# Patient Record
Sex: MALE | Race: WHITE | URBAN - METROPOLITAN AREA
[De-identification: names, ages, dates, MRNs, and addresses within clinical notes are randomized per-mention and may not be internally consistent; named-entity substitution may affect disease eponyms.]

---

## 2017-09-25 ENCOUNTER — HISTORICAL (OUTPATIENT)
Dept: ADMINISTRATIVE | Facility: HOSPITAL | Age: 77
End: 2017-09-25

## 2019-12-18 ENCOUNTER — HISTORICAL (OUTPATIENT)
Dept: ADMINISTRATIVE | Facility: HOSPITAL | Age: 79
End: 2019-12-18

## 2020-01-13 ENCOUNTER — HISTORICAL (OUTPATIENT)
Dept: CARDIOLOGY | Facility: HOSPITAL | Age: 80
End: 2020-01-13

## 2021-10-27 ENCOUNTER — HISTORICAL (OUTPATIENT)
Dept: ADMINISTRATIVE | Facility: HOSPITAL | Age: 81
End: 2021-10-27

## 2022-04-09 ENCOUNTER — HISTORICAL (OUTPATIENT)
Dept: ADMINISTRATIVE | Facility: HOSPITAL | Age: 82
End: 2022-04-09

## 2022-04-25 VITALS
HEIGHT: 63 IN | DIASTOLIC BLOOD PRESSURE: 59 MMHG | SYSTOLIC BLOOD PRESSURE: 109 MMHG | WEIGHT: 244 LBS | BODY MASS INDEX: 43.23 KG/M2

## 2022-04-30 NOTE — OP NOTE
DATE OF SURGERY:    01/13/2020    SURGEON:  Zoltan Villa MD    INDICATION FOR PROCEDURES:  Chest pain, positive stress test.    PROCEDURES PERFORMED:    1. Moderate sedation.  2. Left radial artery access under ultrasound guidance - under ultrasound guidance, the left radial artery was well-visualized.  No evidence of thrombus.  Left radial artery was cannulated.  A 6-Thai 11 cm arterial sheath was placed.  3. Selective left and right coronary angiogram.  4. Selective angiogram of vein graft to the left anterior descending.  5. Selective angiogram of vein graft to the obtuse marginal.  6. Status post laser atherectomy of the mid body of the vein graft to the left anterior descending.  We used a 0.9 laser and did 2 runs of laser atherectomy.  7. Status post balloon angioplasty and stenting of the vein graft to the left anterior descending using a 2.5 x 12 drug-eluting stent.  8. Status post dilatation using a 2.75 x 12 drug-eluting stent.  9. Intravascular ultrasound of the vein graft to the left anterior descending.    PROCEDURE IN DETAIL:  The patient was brought to the cardiac catheterization laboratory in a fasting nonsedated state, prepped and draped in the usual sterile fashion.  2% lidocaine was used in the right groin area as local anesthesia.  Using micropuncture needle, left radial artery was accessed.  A 6-Thai 11 cm arterial sheath was placed.  Then using a JL4 and JR4 catheter, left and right coronary __________ Seldinger technique.  Multiple angiographic views were obtained under fluoroscopy.  The patient had 2 vein grafts.  I engaged both the vein grafts using an AL1 catheter and multiple angiographic views were obtained under fluoroscopy.  There was a stent in the body of the vein graft to the LAD.  It had an in-stent restenosis of 80%.  We proceeded with balloon angioplasty and stenting of the lesion.  We used an AL1 guide, engaged the vein graft to the LAD, crossed it with a KAY  blue wire.  Then we did laser atherectomy using a 0.9 laser, 2-3 runs were performed.  There was one spot by the previous stent that appeared to be under expanded.  Even after balloon angioplasty, he still had severe residual stenosis, so we proceeded with stenting.  We used a 2.5 x 12 mm drug-eluting stent and post dilated using a 2.5 noncompliant balloon.  Then we did an intravascular ultrasound post stenting which showed good stent position.  All catheters and wires were then removed.  The procedure deemed complete.    RESULTS:    1. The left main bifurcates into the LAD and left circumflex artery.  The distal left main has a 70% to 80% stenosis.  2. The left circumflex artery is patent with around 40% mid stenosis right before the anastomosis of the vein graft.  3. There is a diagonal which is patent.  4. The LAD is totally occluded.  5. The right coronary artery has mild atherosclerotic disease with no significant stenosis, gives rise to a large PDA and posterolateral artery which have mild to moderate atherosclerotic disease.  6. The vein graft to the LAD is patent at its origin.  In the body there is a stent that has severe in-stent restenosis in the body of the vein graft to the LAD.  We proceeded with balloon angioplasty and stenting.  We did laser atherectomy, balloon angioplasty, and stenting of the body of the vein graft to the LAD.  Used a 2.5 x 12 mm drug-eluting stent and post dilated using a 2.75 balloon.  On intravascular ultrasound post intervention, it appeared the stent appeared to be well-opposed.    PLAN AND ASSESSMENT:  Status post balloon angioplasty and stenting of the vein graft to the left anterior descending using a 2.5 x 12 mm drug-eluting stent, post dilated using a 2.75 balloon.  The patient will be on aspirin and Plavix.     The patient will be discharged later home today.        ______________________________  MD HAYDEE Snow/UK  DD:  01/13/2020  Time:  12:57PM  DT:   01/13/2020  Time:  01:30PM  Job #:  [id number]

## 2022-05-02 NOTE — HISTORICAL [facility] CERNER
This is a historical note converted from [employer]. Formatting and pictures may have been removed.  Please reference Cererendira for original formatting and attached multimedia. Chief Complaint  Here for bilateral hip pain. Pt stated the pain has been going on for about 1 year.  History of Present Illness  Patient is here for evaluation of bilateral buttock pain and back pain with radiation into the posterior thighs.? He has had a history of discectomy?in the past.? He has no groin pain.? He has no lateral hip pain.? No bowel or bladder dysfunction.? No numbness.? No tingling.  Review of Systems  Systemic: No fever, no chills, and no recent weight change.  Head: No headache - frequent.  Eyes: No vision problems.  Otolarnygeal: No hearing loss, no earache, no epistaxis, no hoarseness, and no tooth pain. Gums normal.  Cardiovascular: No chest pain or discomfort and no palpitations.  Pulmonary: No pulmonary symptoms - difficulty sleeping, no dyspnea, and cough not worse in the morning.  Gastrointestinal: Appetite not decreased. No dysphagia and no constant eructation. No nausea, no vomiting, no abdominal pain, no hematochezia, and no loose/mushy stools - frequent. No constipation - frequent.  Genitourinary: No genitourinary symptoms - Getting up every night to urinate and no increase in urinary frequency. No urinary hesitancy. No urinary loss of control - difficulty stopping urination and no burning sensation during urination.  Musculoskeletal: No calf muscle cramps and no localized soft tissue swelling of the ankle.  Neurological: No fainting and no convulsions.  Psychological: Not feeling nervous tension, not feeling nervous from exhaustion, and no depression.  Skin: No rash. Previous history of no ulcers.  Physical Exam  Vitals & Measurements  T:?37.0? ?C (Oral)? HR:?60(Peripheral)? BP:?109/59?  HT:?161.00?cm? WT:?110.670?kg? BMI:?42.7?  Lumbar exam:  Normal hip range of motion with no tenderness  No anterior hip  tenderness  No lateral hip tenderness  No tenderness over the greater trochanteric bursae  Tenderness in the right and left iliolumbar regions  No midline lumbar tenderness  Tenderness with forward flexion as well as right and left?lateral bend  Tenderness with?lumbar extension  Tenderness in the?right and left sciatic notches?and buttocks  2+ dorsal pedal pulses  Normal sensation  Normal motor function  Patient ambulates slightly stooped over with a cane  Radiographs of the?pelvis and both hips?show normal cartilage spaces and no?significant hip pathology. ?Patient has?lumbar degenerative disc disease.  Assessment/Plan  1.?Lumbar degenerative disc disease?M51.36  ?PT ordered?for lumbar program  Ordered:  Clinic Follow-up PRN, 10/27/21 11:57:00 CDT, Future Order, [facility]  Office/Outpatient Visit Level 3 Established [id number] PC, Lumbar degenerative disc disease  Lumbar radiculopathy, [facility], 10/27/21 11:56:00 CDT  ?  2.?Lumbar radiculopathy?M54.16  Ordered:  Clinic Follow-up PRN, 10/27/21 11:57:00 CDT, Future Order, [facility]  Office/Outpatient Visit Level 3 Established [id number] PC, Lumbar degenerative disc disease  Lumbar radiculopathy, [facility], 10/27/21 11:56:00 CDT  ?  Orders:  XR Hips Bilateral W AP Pelvis, Routine, 10/27/21 11:01:00 CDT, None, Ambulatory, Rad Type, Bilateral hip pain, Not Scheduled, 10/27/21 11:01:00 CDT  Referrals  PT/OT Ambulatory Referral, Specialty: Physical Therapy, Start: 10/27/21 11:51:00 CDT, 4, Home Exercise program  No Dry Needling  Therapeutic Excercise, Instructions: ****LUMBAR PT ORDERS********, Degenerative disc disease, lumbar  Sciatica, 3 X Week  Clinic Follow-up PRN, 10/27/21 11:57:00 CDT, Future Order, [facility]   Problem List/Past Medical History  Ongoing  Contusion of right knee  DM (diabetes mellitus)  History of arthroplasty of right knee  Lumbar degenerative disc disease  Lumbar radiculopathy  Morbid obesity  Primary  osteoarthritis of left knee  Tobacco user  Historical  CAD (coronary atherosclerotic disease)  HTN (hypertension)  Hyperlipidemia  LA (obstructive sleep apnea)  Procedure/Surgical History  Catheter placement in coronary artery(s) for coronary angiography, including intraprocedural injection(s) for coronary angiography, imaging supervision and interpretation; with catheter placement(s) in bypass graft(s) (internal mammary, free arterial, katie (01/13/2020)  Dilation of Coronary Artery, One Artery with Drug-eluting Intraluminal Device, Percutaneous Approach (01/13/2020)  Endoluminal imaging of coronary vessel or graft using intravascular ultrasound (IVUS) or optical coherence tomography (OCT) during diagnostic evaluation and/or therapeutic intervention including imaging supervision, interpretation and report; initial vess (01/13/2020)  Extirpation of Matter from Coronary Artery, One Artery, Percutaneous Approach (01/13/2020)  Fluoroscopy of Multiple Coronary Artery Bypass Grafts using Low Osmolar Contrast (01/13/2020)  Perc d-e cor revasc t CABG s (01/13/2020)  Ultrasonography of Multiple Coronary Arteries, Intravascular (01/13/2020)  Ultrasonography of Single Coronary Artery, Intravascular (01/13/2020)  Dilation of Coronary Artery, One Site with Drug-eluting Intraluminal Device, Percutaneous Approach (02/02/2016)  Dilation of Coronary Artery, One Site, Percutaneous Approach (02/02/2016)  Fluoroscopy of Left Heart using Low Osmolar Contrast (02/02/2016)  Fluoroscopy of Multiple Coronary Arteries using Low Osmolar Contrast (02/02/2016)  Fluoroscopy of Multiple Coronary Artery Bypass Grafts using Low Osmolar Contrast (02/02/2016)  Measurement of Cardiac Sampling and Pressure, Left Heart, Percutaneous Approach (02/02/2016)  Cardiac pacemaker (2014)  CABG x 2 - Coronary artery bypass grafts x 2 (1992)  BACK SURGERY (1976)  CARDIAC STENTS  SHOULDER REPLACEMENTS  Total knee replacement   Medications  amLODIPine 10 mg oral  tablet, 10 mg= 1 tab(s), Oral, Daily  aspirin 81 mg oral tablet, 81 mg= 1 tab(s), Oral, Daily  atorvastatin 80 mg oral tablet, 80 mg= 1 tab(s), Oral, Daily  benazepril 40 mg oral tablet, 40 mg= 1 tab(s), Oral, Daily  clopidogrel 75 mg oral tablet, 75 mg= 1 tab(s), Oral, Daily  diclofenac 1% topical gel, 2 gm, TOP, QID  fluoxetine 10 mg oral capsule, 10 mg= 1 cap(s), Oral, Daily  furosemide 20 mg oral tablet, 20 mg= 1 tab(s), Oral, Daily  isosorbide MONOnitrate 60 mg oral tablet, Extended Release, 60 mg= 1 tab(s), Oral, Daily  levothyroxine 100 mcg (0.1 mg) oral tablet, 100 mcg= 1 tab(s), Oral, Daily  metFORMIN 500 mg oral tablet, extended release, 500 mg= 1 tab(s), Oral, BID  metoprolol succinate 25 mg oral tablet, extended release, 25 mg= 1 tab(s), Oral, Daily  Pantoprazole 40 mg ORAL EC-Tablet, 40 mg= 1 tab(s), Oral, Daily  Allergies  No Known Medication Allergies  Social History  Abuse/Neglect  No, 10/27/2021  Alcohol - Denies Alcohol Use, 02/02/2016  Never, 09/25/2017  Employment/School  Retired, 09/25/2017  Home/Environment  Lives with Spouse., 09/25/2017  Substance Use - Denies Substance Abuse, 02/02/2016  Tobacco - Denies Tobacco Use, 02/02/2016  Former smoker, quit more than 30 days ago, No, 10/27/2021  Never (less than 100 in lifetime), N/A, 12/18/2019  Family History  Coronary artery disease: Father.  Health Maintenance  Health Maintenance  ???Pending?(in the next year)  ??? ??OverDue  ??? ? ? ?Depression Screening due??09/25/18??and every 1??year(s)  ??? ? ? ?Smoking Cessation due??01/01/21??and every 1??year(s)  ??? ? ? ?Advance Directive due??01/02/21??and every 1??year(s)  ??? ? ? ?Cognitive Screening due??01/02/21??and every 1??year(s)  ??? ? ? ?Functional Assessment due??01/02/21??and every 1??year(s)  ??? ??Due?  ??? ? ? ?ADL Screening due??10/27/21??and every 1??year(s)  ??? ? ? ?Diabetes Maintenance-Eye Exam due??10/27/21??Unknown Frequency  ??? ? ? ?Diabetes Maintenance-Foot Exam  due??10/27/21??Unknown Frequency  ??? ? ? ?Medicare Annual Wellness Exam due??10/27/21??and every 1??year(s)  ??? ? ? ?Pneumococcal Vaccine due??10/27/21??Unknown Frequency  ??? ? ? ?Tetanus Vaccine due??10/27/21??and every 10??year(s)  ??? ? ? ?Zoster Vaccine due??10/27/21??Unknown Frequency  ??? ??Due In Future?  ??? ? ? ?Obesity Screening not due until??01/01/22??and every 1??year(s)  ??? ? ? ?Fall Risk Assessment not due until??01/02/22??and every 1??year(s)  ??? ? ? ?Diabetes Maintenance-HgbA1c not due until??02/18/22??and every 1??year(s)  ??? ? ? ?Hypertension Management-BMP not due until??03/23/22??and every 1??year(s)  ???Satisfied?(in the past 1 year)  ??? ??Satisfied?  ??? ? ? ?Blood Pressure Screening on??10/27/21.??Satisfied by Yajaira Reyez LPN.  ??? ? ? ?Body Mass Index Check on??10/27/21.??Satisfied by Yajaira Reyez LPN.  ??? ? ? ?Fall Risk Assessment on??10/27/21.??Satisfied by Yajaira Reyez LPN.  ??? ? ? ?Hypertension Management-Blood Pressure on??10/27/21.??Satisfied by Yajaira Reyez LPN.  ??? ? ? ?Obesity Screening on??10/27/21.??Satisfied by Yajaira Reyez LPN.  ?

## 2022-05-02 NOTE — HISTORICAL [facility] CERNER
This is a historical note converted from [employer]. Formatting and pictures may have been removed.  Please reference Cererendira for original formatting and attached multimedia. Chief Complaint  LEFT KNEE PAIN. NO INJURY.  History of Present Illness  ?left_ KNEE  ?  Knee joint pain, Worse with weightbearing  Slowly worsens with extended activity  Pain is increased by bending it and by twisting  complains of knee joint swelling and stiffness  Catching and grating during movement  Review of Systems  Systemic: No fever, no chills, recent?26 lb?weight gain  Head: No headache - frequent.  Eyes: No vision problems.  Otolarnygeal: No hearing loss, no earache, no epistaxis, no hoarseness, and no tooth pain. Gums normal.  Cardiovascular: No chest pain or discomfort and no palpitations.  Pulmonary: No pulmonary symptoms - difficulty sleeping, no dyspnea, and cough not worse in the morning.  Gastrointestinal: Appetite not decreased. No dysphagia and no constant eructation. No nausea, no vomiting, no abdominal pain, no hematochezia, and no loose/mushy stools - frequent. No constipation - frequent.  Genitourinary: No genitourinary symptoms - Getting up every night to urinate and no increase in urinary frequency. No urinary hesitancy. No urinary loss of control - difficulty stopping urination and no burning sensation during urination.  Musculoskeletal: No calf muscle cramps and no localized soft tissue swelling of the ankle.  Neurological: No fainting and no convulsions.  Psychological: Not feeling nervous tension, not feeling nervous from exhaustion, and no depression.  Skin: No rash. Previous history of no ulcers.  Physical Exam  Vitals & Measurements  BP:?132/70?  HT:?157?cm? HT:?157?cm? WT:?102.5?kg? WT:?102.5?kg? BMI:?41.58?  PHYSICAL FINDINGS  Cardiovascular:  Arterial Pulses: Posterior tibialis pulses were normal left. Dorsalis pedis pulses were normal left.  Musculoskeletal System:  Thigh:  Left Thigh: Thigh showed quadriceps  atrophy.  Knee:  Left Knee: Examined.  Knee:  Grade in the knee: Value  Grade effusion 1  Genu varum. Patella demonstrated crepitus. Anteromedial aspect was tender on palpation. Medial aspect was tender on palpation. Medial collateral ligament was tender on palpation. Active motion.  Left Knee:  Left Knee Motion: Value  Active flexion 120_ degrees  Active extension 0_ degrees  Pain was elicited by flexion. No erythema. No warmth. No medial instability. No lateral instability. No one plane medial (straight) instability. No one plane lateral (straight) instability. A Lachman test did not demonstrate one plane anterior instability.  Neurological:  Gait And Stance: A left-sided antalgic gait was observed.  ?  ?  TESTS  Imaging:  X-Ray Knee:  A complete knee x-ray with standing views was performed -of left knee.  AP and lateral view x-rays of the left knee with sunrise view of the patella were performed -of left knee.  ?  ?  IMPRESSIONS RADIOLOGY TEST  Mild?narrowing of the medial compartment?of the left knee.? Patient has bone on bone in the patellofemoral?articulation of the left knee.? Chondrocalcinosis of the lateral compartment.  Assessment/Plan  1.?Primary osteoarthritis of left knee  ? Home exercises  Ordered:  betamethasone, 12 mg, Intra-Articular, Once, first dose 09/25/17 9:00:00 CDT, stop date 09/25/17 9:00:00 CDT, 24  Lidocaine inj., 2 mL, Intra-Articular, Once, first dose 09/25/17 8:36:00 CDT, stop date 09/25/17 8:36:00 CDT  asp/inj jnt/bursa, major [id number] PC, 09/25/17 8:36:00 CDT, [facility], Routine, 09/25/17 8:36:00 CDT  Office/Outpatient Visit Level 3 Established [id number] PC, Primary osteoarthritis of left knee  Morbid obesity, [facility], 09/25/17 8:36:00 CDT  ?  2.?Morbid obesity  ? Weight loss  Ordered:  Office/Outpatient Visit Level 3 Established [id number] PC, Primary osteoarthritis of left knee  Morbid obesity, [facility], 09/25/17 8:36:00 CDT  ?  Orders:  Clinic  Follow-up PRN, 09/25/17 8:36:00 CDT, Future Order, [facility]   Problem List/Past Medical History  Ongoing  DM (diabetes mellitus)  Morbid obesity  Primary osteoarthritis of left knee  Tobacco user  Historical  CAD (coronary atherosclerotic disease)  HTN (hypertension)  Hyperlipidemia  LA (obstructive sleep apnea)  Procedure/Surgical History  Dilation of Coronary Artery, One Site with Drug-eluting Intraluminal Device, Percutaneous Approach (02/02/2016)  Dilation of Coronary Artery, One Site, Percutaneous Approach (02/02/2016)  Fluoroscopy of Left Heart using Low Osmolar Contrast (02/02/2016)  Fluoroscopy of Multiple Coronary Arteries using Low Osmolar Contrast (02/02/2016)  Fluoroscopy of Multiple Coronary Artery Bypass Grafts using Low Osmolar Contrast (02/02/2016)  Measurement of Cardiac Sampling and Pressure, Left Heart, Percutaneous Approach (02/02/2016)  Cardiac pacemaker 27-APR-2016 23:35:49<$> (2014)  CABG x 2 - Coronary artery bypass grafts x 2 (1992)  BACK SURGERY (1976)  CARDIAC STENTS  SHOULDER REPLACEMENTS  Total knee replacement 27-APR-2016 12:19:03<$>  Medications  aspirin 81 mg oral tablet, 81 mg= 1 tab(s), Oral, Daily  benazepril 20 mg oral tablet, 20 mg= 1 tab(s), Oral, Daily  Celestone, 12 mg, Intra-Articular, Once  cetirizine 10 mg oral capsule, 10 mg= 1 cap(s), Oral, Daily, PRN  clonazePAM 1 mg oral tablet, 1 mg= 1 tab(s), Oral, At Bedtime  Effient 10 mg oral tablet, 10 mg= 1 tab(s), Oral, Daily, 3 refills  fluoxetine 20 mg oral capsule, 20 mg= 1 cap(s), Oral, Daily  hydrochlorothiazide-triamterene 25 mg-37.5 mg oral capsule, 1 cap(s), Oral, Daily  isosorbide MONOnitrate 30 mg oral tablet, Extended Release, 30 mg= 1 tab(s), Oral, qAM  isosorbide MONOnitrate 30 mg oral tablet, Extended Release, 1/2 TABS, Oral, qAM,? ?Not taking  levothyroxine 112 mcg (0.112 mg) oral tablet, 112 mcg= 1 tab(s), Oral, Daily  lidocaine 2% injectable solution, 2 mL, Intra-Articular, Once  metformin 500 mg oral  tablet, 500 mg= 1 tab(s), Oral, Daily  Pantoprazole 40 mg ORAL EC-Tablet, 40 mg= 1 tab(s), Oral, Daily  simvastatin 20 mg oral tablet, 20 mg= 1 tab(s), Oral, Once a day (at bedtime)  Allergies  No Known Medication Allergies  Social History  Alcohol - Denies Alcohol Use, 09/25/2017  Never  Employment/School - 09/25/2017  Retired  Home/Environment - 09/25/2017  Lives with Spouse.  Substance Abuse - Denies Substance Abuse, 09/25/2017  Tobacco - Denies Tobacco Use, 09/25/2017  Smoker, current status unknown  Family History  Coronary artery disease: Father.      After verbal consent left knee was prepped in sterile fashion. 2 mL of lidocaine and 2 mL of betamethasone was injected intra-articularly on a 25-gauge needle. Patient tolerated the procedure well  ?  ?  ?  ?

## 2022-05-02 NOTE — HISTORICAL [facility] CERNER
This is a historical note converted from Jony. Formatting and pictures may have been removed.  Please reference Jony for original formatting and attached multimedia. Chief Complaint  Pt is here for right clavical pain and right knee pain. Pt stated the pain started when he fell 2 weeks ago.  History of Present Illness  Jonn comes in today for evaluation of his right knee and right?clavicle pain.? He fell 1 week ago. ?He has a history of right total knee arthroplasty.? Overall his pain is improving?but he wanted to have this checked out.? When he fell he landed directly onto his right knee and felt his arm?pulled back. ?He had?anterior?shoulder?and clavicular pain at that time.? He is able to?move all extremities well.? He denies any numbness or tingling in the upper or lower extremity.  Review of Systems  Systemic: No fever, no chills, and no recent weight change.  Head: No headache - frequent.  Eyes: No vision problems.  Otolarnygeal: No hearing loss, no earache, no epistaxis, no hoarseness, and no tooth pain. Gums normal.  Cardiovascular: No chest pain or discomfort and no palpitations.  Pulmonary: No pulmonary symptoms - no dyspnea, no shortness of breath  Gastrointestinal: Appetite not decreased. No dysphagia and no constant eructation. No nausea, no vomiting, no abdominal pain, no hematochezia.  Genitourinary: No genitourinary symptoms - No urinary hesitancy. No urinary loss of control - no burning sensation during urination.  Musculoskeletal: No calf muscle cramps and no localized soft tissue swelling  Neurological: No fainting and no convulsions.  Psychological: no depression.  Skin: No rash.  Physical Exam  Vitals & Measurements  T:?36.7? ?C (Oral)? HR:?80(Peripheral)? BP:?132/61?  HT:?157?cm? WT:?102.5?kg? BMI:?41.58?  Cardiovascular:  Arterial Pulses: ? Dorsalis pedis pulses were normal.  Musculoskeletal System:  Right?knee:  General: ? No swelling of the knee. ? No warmth of the knee. ?? Small  abrasion noted over the?anterior knee?. no pain was elicited by motion of the knee. ? No instability of the knee. ? Knees demonstrated no muscle weakness.  Right?knee: ? Motion was normal.  Active flexion?120 degrees  Active extension 0 degrees  Neurological:  Sensation: ? Monofilament wire test of the leg/foot was normal.  Motor (Strength): ? No weakness of the?right knee was observed.  Skin:  ? No cellulitis. Surgical incision well healed ?  Tests  Imaging:  X-Ray Knee:  A complete knee x-ray with standing views was performed -of _right?knee.  Impressions Radiology Test  X-ray of knee was performed intact?right knee implant.? No fracture seen  ?   Right shoulder exam  No swelling, erythema, increased heat  Well-healed surgical scar  Mild tenderness over the?mid clavicle?but no palpable deformity?or skin tenting seen  Full active and passive range of motion to the right shoulder joint without pain  Sensation intact distally  Radial pulses 2+  ?   Right shoulder radiographs taken office today show?intact?prostheses with no signs of fracture,?or loosening.? No clavicle fracture seen  Assessment/Plan  1.?Injury of right clavicle?S49.91XA  ?Resume all activities as tolerated. ?Follow-up with us as needed  ?  2.?Contusion of right knee?S80.01XA  ?Ice and over-the-counter medication as needed  Ordered:  Office/Outpatient Visit Level 3 Established [id number] PC, Contusion of right knee  History of arthroplasty of right knee, [facility], 12/18/19 14:26:00 CST  ?  3.?History of arthroplasty of right knee?Z96.651  Ordered:  Office/Outpatient Visit Level 3 Established [id number] PC, Contusion of right knee  History of arthroplasty of right knee, [facility], 12/18/19 14:26:00 CST  ?  Orders:  Clinic Follow-up PRN, 12/18/19 14:26:00 CST, Future Order, Orthopaedics  Referrals  Clinic Follow-up PRN, 12/18/19 14:26:00 CST, [facility]   Problem List/Past Medical History  Ongoing  Contusion of right  knee  DM (diabetes mellitus)  History of arthroplasty of right knee  Morbid obesity  Primary osteoarthritis of left knee  Tobacco user  Historical  CAD (coronary atherosclerotic disease)  HTN (hypertension)  Hyperlipidemia  LA (obstructive sleep apnea)  Procedure/Surgical History  Dilation of Coronary Artery, One Site with Drug-eluting Intraluminal Device, Percutaneous Approach (02/02/2016)  Dilation of Coronary Artery, One Site, Percutaneous Approach (02/02/2016)  Fluoroscopy of Left Heart using Low Osmolar Contrast (02/02/2016)  Fluoroscopy of Multiple Coronary Arteries using Low Osmolar Contrast (02/02/2016)  Fluoroscopy of Multiple Coronary Artery Bypass Grafts using Low Osmolar Contrast (02/02/2016)  Measurement of Cardiac Sampling and Pressure, Left Heart, Percutaneous Approach (02/02/2016)  Cardiac pacemaker (2014)  CABG x 2 - Coronary artery bypass grafts x 2 (1992)  BACK SURGERY (1976)  CARDIAC STENTS  SHOULDER REPLACEMENTS  Total knee replacement   Medications  amLODIPine 10 mg oral tablet, 10 mg= 1 tab(s), Oral, Daily  aspirin 81 mg oral tablet, 81 mg= 1 tab(s), Oral, Daily  atorvastatin 80 mg oral tablet, 80 mg= 1 tab(s), Oral, Daily  benazepril 20 mg oral tablet, 20 mg= 1 tab(s), Oral, Daily,? ?Not taking: dup  benazepril 40 mg oral tablet, 40 mg= 1 tab(s), Oral, Daily  cetirizine 10 mg oral capsule, 10 mg= 1 cap(s), Oral, Daily, PRN,? ?Not taking: dup  clonazePAM 1 mg oral tablet, 1 mg= 1 tab(s), Oral, At Bedtime,? ?Not taking: dup  clopidogrel 75 mg oral tablet, 75 mg= 1 tab(s), Oral, Daily  Effient 10 mg oral tablet, 10 mg= 1 tab(s), Oral, Daily, 3 refills,? ?Not taking: dup  fluoxetine 10 mg oral capsule, 10 mg= 1 cap(s), Oral, Daily  fluoxetine 20 mg oral capsule, 20 mg= 1 cap(s), Oral, Daily,? ?Not taking: dup  furosemide 20 mg oral tablet, 20 mg= 1 tab(s), Oral, Daily  hydrochlorothiazide-triamterene 25 mg-37.5 mg oral capsule, 1 cap(s), Oral, Daily,? ?Not taking: dup  isosorbide MONOnitrate 30  mg oral tablet, Extended Release, 30 mg= 1 tab(s), Oral, qAM,? ?Not taking: dup  isosorbide MONOnitrate 30 mg oral tablet, Extended Release, 1/2 TABS, Oral, qAM,? ?Not taking: dup  isosorbide MONOnitrate 60 mg oral tablet, Extended Release, 60 mg= 1 tab(s), Oral, Daily  levothyroxine 100 mcg (0.1 mg) oral tablet, 100 mcg= 1 tab(s), Oral, Daily  levothyroxine 112 mcg (0.112 mg) oral tablet, 112 mcg= 1 tab(s), Oral, Daily,? ?Not taking: dup  metformin 500 mg oral tablet, 500 mg= 1 tab(s), Oral, Daily,? ?Not taking: dup  metFORMIN 500 mg oral tablet, extended release, 500 mg= 1 tab(s), Oral, BID  metoprolol succinate 25 mg oral tablet, extended release, 25 mg= 1 tab(s), Oral, Daily  Pantoprazole 40 mg ORAL EC-Tablet, 40 mg= 1 tab(s), Oral, Daily  simvastatin 20 mg oral tablet, 20 mg= 1 tab(s), Oral, Once a day (at bedtime),? ?Not taking: dup  Allergies  No Known Medication Allergies  Social History  Abuse/Neglect  No, 12/18/2019  Alcohol - Denies Alcohol Use, 02/02/2016  Never, 09/25/2017  Employment/School  Retired, 09/25/2017  Home/Environment  Lives with Spouse., 09/25/2017  Substance Use - Denies Substance Abuse, 02/02/2016  Tobacco - Denies Tobacco Use, 02/02/2016  Never (less than 100 in lifetime), N/A, 12/18/2019  Family History  Coronary artery disease: Father.  Health Maintenance  Health Maintenance  ???Pending?(in the next year)  ??? ??OverDue  ??? ? ? ?Aspirin Therapy for CVD Prevention due??02/03/17??and every 1??year(s)  ??? ? ? ?Advance Directive due??01/01/19??and every 1??year(s)  ??? ? ? ?Cognitive Screening due??01/01/19??and every 1??year(s)  ??? ? ? ?Functional Assessment due??01/01/19??and every 1??year(s)  ??? ? ? ?Geriatric Depression Screening due??01/01/19??and every 1??year(s)  ??? ? ? ?Smoking Cessation due??01/01/19??and every 1??year(s)  ??? ??Due?  ??? ? ? ?ADL Screening due??12/18/19??and every 1??year(s)  ??? ? ? ?Pneumococcal Vaccine due??12/18/19??Variable frequency  ??? ? ? ?Tetanus  Vaccine due??12/18/19??and every 10??year(s)  ??? ? ? ?Zoster Vaccine due??12/18/19??and every 100??year(s)  ??? ??Due In Future?  ??? ? ? ?Fall Risk Assessment not due until??01/01/20??and every 1??year(s)  ??? ? ? ?Obesity Screening not due until??01/01/20??and every 1??year(s)  ???Satisfied?(in the past 1 year)  ??? ??Satisfied?  ??? ? ? ?Blood Pressure Screening on??12/18/19.??Satisfied by Yajaira Jean Baptiste LPN  ??? ? ? ?Body Mass Index Check on??12/18/19.??Satisfied by Yajaira Jean Baptiste LPN  ??? ? ? ?Coronary Artery Disease Maintenance-Antiplatelet Agent Prescribed on??12/18/19.  ??? ? ? ?Fall Risk Assessment on??12/18/19.??Satisfied by Yajaira Jean Baptiste LPN  ??? ? ? ?Obesity Screening on??12/18/19.??Satisfied by Yajaira Jean Baptiste LPN  ?